# Patient Record
Sex: MALE | Race: WHITE
[De-identification: names, ages, dates, MRNs, and addresses within clinical notes are randomized per-mention and may not be internally consistent; named-entity substitution may affect disease eponyms.]

---

## 2017-06-10 ENCOUNTER — HOSPITAL ENCOUNTER (EMERGENCY)
Dept: HOSPITAL 43 - DL.ED | Age: 27
Discharge: HOME | End: 2017-06-10
Payer: SELF-PAY

## 2017-06-10 VITALS — SYSTOLIC BLOOD PRESSURE: 115 MMHG | DIASTOLIC BLOOD PRESSURE: 79 MMHG

## 2017-06-10 DIAGNOSIS — F17.210: ICD-10-CM

## 2017-06-10 DIAGNOSIS — M62.838: ICD-10-CM

## 2017-06-10 DIAGNOSIS — M75.101: Primary | ICD-10-CM

## 2017-06-10 PROCEDURE — 72040 X-RAY EXAM NECK SPINE 2-3 VW: CPT

## 2017-06-10 PROCEDURE — 73030 X-RAY EXAM OF SHOULDER: CPT

## 2017-06-10 PROCEDURE — 96372 THER/PROPH/DIAG INJ SC/IM: CPT

## 2017-06-10 PROCEDURE — 99283 EMERGENCY DEPT VISIT LOW MDM: CPT

## 2017-06-10 NOTE — EDM.PDOC
ED HPI GENERAL MEDICAL PROBLEM





- General


Chief Complaint: Neck Problem


Stated Complaint: [id number] NECK AND RIGHT SHOULDER


Time Seen by Provider: 06/10/17 10:23


Source of Information: Reports: Patient, Old Records, RN, RN Notes Reviewed


History Limitations: Reports: No Limitations





- History of Present Illness


INITIAL COMMENTS - FREE TEXT/NARRATIVE: 


C/O onset of pain and tightness in the Rt shoulder and neck. Denies any 

specific injury. No radiating pain, numbness, or weakness. 





Onset: Gradual


Duration: Day(s): (2-3), Constant


Location: Reports: Neck, Upper Extremity, Right


Quality: Reports: Ache, Other (spasms)


Severity: Severe


Improves with: Reports: Immobilization, Rest


Worsens with: Reports: Movement


Associated Symptoms: Reports: No Other Symptoms


  ** Right Shoulder


Pain Score (Numeric/FACES): 9





- Related Data


 Allergies











Allergy/AdvReac Type Severity Reaction Status Date / Time


 


No Known Allergies Allergy   Verified 06/10/17 10:18











Home Meds: 


 Home Meds





. [No Known Home Meds]  09/12/16 [History]











Past Medical History





- Past Health History


Medical/Surgical History: Denies Medical/Surgical History





Social & Family History





- Family History


Family Medical History: Noncontributory





- Tobacco Use


Smoking Status *Q: Current Every Day Smoker


Years of Tobacco use: 8


Packs/Tins Daily: 0.2





- Caffeine Use


Caffeine Use: Reports: Soda





- Alcohol Use


Days Per Week of Alcohol Use: 2


Number of Drinks Per Day: 7


Total Drinks Per Week: 14





- Recreational Drug Use


Recreational Drug Use: No





- Living Situation & Occupation


Living situation: Reports: with Significant Other


Occupation: Employed





ED ROS GENERAL





- Review of Systems


Review Of Systems: ROS reveals no pertinent complaints other than HPI.





ED EXAM, UPPER BACK/NECK PAIN





- Physical Exam


Exam: See Below


Exam Limited By: No Limitations


General Appearance: Alert, WD/WN, No Apparent Distress


Throat/Mouth Exam: Normal Inspection


Head Exam: Atraumatic, Normocephalic


Neck Exam: Muscle Spasm (R>L lower cervical paraspinal), Painful Range of Motion

, Paraspinous Muscle Tender.  No: Spinous Processes Tender


Nexus Criteria: No: Posterior, Midline Cervical Tenderness, Evidence of 

Intoxication, Altered Level of Consciousness, Focal Neurological Deficit, 

Painful Distraction Injuries


Cardiovascular/Respiratory: Regular Rate, Rhythm, Normal Peripheral Pulses, 

Normal Breath Sounds


Back Exam: Full Range of Motion, Muscle Spasm (Rt upper paraspinal and Rt trap.)

.  No: Vertebral Tenderness


Extremities: Non-Tender, Other (Rt shoulder with non-painful ROM below shoulder 

level. Pain with abduction, an abduction limited to just slightly less than 90 

degrees, pain improved w/R arm passively held at 90 degrees abduction).  No: 

Joint Swelling, Increased Warmth, Redness


Neurologic: CNs II-XII nml As Tested, No Motor/Sensory Deficits, Alert, Normal 

Mood/Affect, Oriented x 3


Psychiatric: Normal Affect, Normal Mood


Skin Exam: Normal Color, Warm/Dry





Course





- Vital Signs


Last Recorded V/S: 


 Last Vital Signs











Temp  36.8 C   06/10/17 10:18


 


Pulse  76   06/10/17 10:18


 


Resp  18   06/10/17 10:18


 


BP  115/79   06/10/17 10:18


 


Pulse Ox  100   06/10/17 10:18














- Orders/Labs/Meds


Orders: 


 Active Orders 24 hr











 Category Date Time Status


 


 Cervical Spine 2V or 3V [CR] Urgent Exams  06/10/17 10:30 Ordered


 


 Shoulder Comp Rt [CR] Urgent Exams  06/10/17 10:30 Ordered











Meds: 


Medications














Discontinued Medications














Generic Name Dose Route Start Last Admin





  Trade Name Freq  PRN Reason Stop Dose Admin


 


Ketorolac Tromethamine  60 mg  06/10/17 10:31  





  Toradol  IM  06/10/17 10:32  





  ONETIME ONE   














- Radiology Interpretation


Free Text/Narrative:: 


Xray C-spine: reversal of cervical lordotic curvature, no fractures; see Rad. 

report.


Xray Rt shoulder: no fractures, no acute findings; see Rad. report.





Departure





- Departure


Time of Disposition: 10:51


Disposition: Home, Self-Care 01


Condition: good


Clinical Impression: 


 Cervical paraspinal muscle spasm, Rotator cuff syndrome of right shoulder








- Discharge Information


Instructions:  Muscle Cramps and Spasms, Easy-to-Read, Impingement Syndrome, 

Rotator Cuff, Bursitis With Rehab-SportsMed


Forms:  ED Department Discharge


Additional Instructions: 


Rx: Naprosyn 500mg


Rx: Cyclobenzaprine 10mg


Moist hot packs to right should/neck as needed for muscle spasms.


Rest right shoulder, maintain range of motion but no repetitive shoulder use or 

heavy lifting.


Avoid overhead reaching with right shoulder.


Follow up in clinic in 1 week if not improved.





- My Orders


Last 24 Hours: 


My Active Orders





06/10/17 10:30


Cervical Spine 2V or 3V [CR] Urgent 


Shoulder Comp Rt [CR] Urgent 














- Assessment/Plan


Last 24 Hours: 


My Active Orders





06/10/17 10:30


Cervical Spine 2V or 3V [CR] Urgent 


Shoulder Comp Rt [CR] Urgent

## 2018-04-09 ENCOUNTER — HOSPITAL ENCOUNTER (EMERGENCY)
Dept: HOSPITAL 43 - DL.ED | Age: 28
Discharge: HOME | End: 2018-04-09
Payer: COMMERCIAL

## 2018-04-09 VITALS — DIASTOLIC BLOOD PRESSURE: 67 MMHG | SYSTOLIC BLOOD PRESSURE: 107 MMHG

## 2018-04-09 DIAGNOSIS — J10.1: Primary | ICD-10-CM

## 2018-04-09 LAB
CHLORIDE SERPL-SCNC: 95 MMOL/L (ref 101–111)
SODIUM SERPL-SCNC: 132 MMOL/L (ref 135–145)

## 2018-04-09 PROCEDURE — 85025 COMPLETE CBC W/AUTO DIFF WBC: CPT

## 2018-04-09 PROCEDURE — 96361 HYDRATE IV INFUSION ADD-ON: CPT

## 2018-04-09 PROCEDURE — 96374 THER/PROPH/DIAG INJ IV PUSH: CPT

## 2018-04-09 PROCEDURE — 80305 DRUG TEST PRSMV DIR OPT OBS: CPT

## 2018-04-09 PROCEDURE — 80053 COMPREHEN METABOLIC PANEL: CPT

## 2018-04-09 PROCEDURE — 99283 EMERGENCY DEPT VISIT LOW MDM: CPT

## 2018-04-09 PROCEDURE — 71046 X-RAY EXAM CHEST 2 VIEWS: CPT

## 2018-04-09 PROCEDURE — 36415 COLL VENOUS BLD VENIPUNCTURE: CPT

## 2018-04-09 PROCEDURE — 87430 STREP A AG IA: CPT

## 2018-04-09 PROCEDURE — 81001 URINALYSIS AUTO W/SCOPE: CPT

## 2018-04-09 PROCEDURE — 87081 CULTURE SCREEN ONLY: CPT

## 2018-04-09 PROCEDURE — 87804 INFLUENZA ASSAY W/OPTIC: CPT

## 2018-04-09 NOTE — CR
Clinical history: 27-year-old male with fever, cough and chest pain.

 

Interpretation: 

 

Subtle peribronchial "cuffing" and some generalized mild air trapping suggesting bronchospasm but.. n
o focal lobar pneumonia.

 

No atelectasis/collapse. No lung mass or hilar lymphadenopathy.

 

Normal cardiac silhouette without alveolar edema or dependent effusion. Left-sided aortic arch.

 

No pneumothorax.

 

CONCLUSION: Chronic mild bronchitis/bronchiolitis. No new lobar pneumonia or signs of heart failure c
ompared to 3 March 2009.

## 2018-04-09 NOTE — EDM.PDOC
ED HPI GENERAL MEDICAL PROBLEM





- General


Chief Complaint: Fever


Stated Complaint: COUGH, FEVER


Time Seen by Provider: 04/09/18 12:34


Source of Information: Reports: Patient, RN, RN Notes Reviewed


History Limitations: Reports: No Limitations





- History of Present Illness


INITIAL COMMENTS - FREE TEXT/NARRATIVE: 


C/O onset of cough Friday, April 6th. Yesterday he reports sudden onset of fever

, chills, body aches, and cough became worse. Denies shortness of breath or 

wheezing. Admits to sharp and burning pains in chest with coughing. Denies 

abdominal pain, N/V/D/C, sore throat, rash or stiff neck.





Onset: Sudden


Duration: Constant


Location: Reports: Chest, Generalized


Quality: Reports: Burning, Sharp


Severity: Severe


Improves with: Reports: None


Worsens with: Reports: None


Associated Symptoms: Reports: No Other Symptoms


Treatments PTA: Reports: Acetaminophen


  ** Middle Chest


Pain Score (Numeric/FACES): 5





- Related Data


 Allergies











Allergy/AdvReac Type Severity Reaction Status Date / Time


 


No Known Allergies Allergy   Verified 04/09/18 12:17











Home Meds: 


 Home Meds





. [No Known Home Meds]  09/12/16 [History]











Past Medical History





- Past Health History


Medical/Surgical History: Denies Medical/Surgical History





Social & Family History





- Family History


Family Medical History: Noncontributory





- Tobacco Use


Smoking Status *Q: Never Smoker


Years of Tobacco use: 0


Packs/Tins Daily: 0





- Caffeine Use


Caffeine Use: Reports: Soda





- Alcohol Use


Days Per Week of Alcohol Use: 2


Number of Drinks Per Day: 7


Total Drinks Per Week: 14





- Recreational Drug Use


Recreational Drug Use: No





- Living Situation & Occupation


Living situation: Reports: with Significant Other


Occupation: Employed





ED ROS GENERAL





- Review of Systems


Review Of Systems: ROS reveals no pertinent complaints other than HPI.





ED EXAM, GENERAL





- Physical Exam


Exam: See Below


Exam Limited By: No Limitations


General Appearance: Alert, WD/WN, No Apparent Distress, Other (acutely ill, but 

non-toxic appearing)


Eye Exam: Bilateral Eye: Normal Inspection


Ears: Normal External Exam, Normal Canal, Hearing Grossly Normal, Normal TMs


Nose: No Blood, Nasal Drainage (clear, mild)


Throat/Mouth: Normal Inspection, Normal Lips, Normal Teeth, Normal Gums, Normal 

Oropharynx, Normal Voice, No Airway Compromise


Head: Atraumatic, Normocephalic


Neck: Normal Inspection, Supple, Non-Tender, Full Range of Motion, Other (no 

nuchal rigidity).  No: Lymphadenopathy (L), Lymphadenopathy (R)


Respiratory/Chest: No Respiratory Distress, No Accessory Muscle Use, Chest Non-

Tender, Crackles, Wheezing (rare/intermittent scattered mild wheezes).  No: 

Rales, Rhonchi


Cardiovascular: Regular Rate, Rhythm, No Edema, No Murmur


GI/Abdominal: Normal Bowel Sounds, Soft, Non-Tender, No Organomegaly, No 

Distention, No Abnormal Bruit, No Mass


 (Male) Exam: Deferred


Rectal (Males) Exam: Deferred


Back Exam: Normal Inspection, Full Range of Motion.  No: CVA Tenderness (L), 

CVA Tenderness (R)


Extremities: Normal Inspection, Normal Range of Motion, Non-Tender, Normal 

Capillary Refill, No Pedal Edema


Psychiatric: Normal Affect, Normal Mood


Skin Exam: Warm, Dry, Intact, Normal Color, No Rash





Course





- Vital Signs


Last Recorded V/S: 


 Last Vital Signs











Temp  40 C H  04/09/18 12:18


 


Pulse  98   04/09/18 12:18


 


Resp  16   04/09/18 12:18


 


BP  107/67   04/09/18 12:18


 


Pulse Ox  99   04/09/18 12:18














- Orders/Labs/Meds


Orders: 


 Active Orders 24 hr











 Category Date Time Status


 


 Peripheral IV Care [RC] .AS DIRECTED Care  04/09/18 12:36 Active


 


 Chest 2V [CR] Stat Exams  04/09/18 12:36 Taken


 


 COMPREHENSIVE METABOLIC PN,CMP [CHEM] Stat Lab  04/09/18 12:39 Received


 


 CULTURE STREP A CONFIRMATION [RM] Stat Lab  04/09/18 12:28 Results


 


 STREP SCRN A RAPID W CULT CONF [RM] Stat Lab  04/09/18 12:28 Results


 


 Sodium Chloride 0.9% [Normal Saline] 1,000 ml Med  04/09/18 12:37 Active





 IV .BOLUS   


 


 Sodium Chloride 0.9% [Saline Flush] Med  04/09/18 12:35 Active





 10 ml FLUSH ASDIRECTED PRN   


 


 Peripheral IV Insertion Adult [OM.PC] Stat Oth  04/09/18 12:35 Ordered








 Medication Orders





Sodium Chloride (Normal Saline)  1,000 mls @ 999 mls/hr IV .BOLUS ONE


   Stop: 04/09/18 13:37


   Last Admin: 04/09/18 12:45  Dose: 999 mls/hr


Sodium Chloride (Saline Flush)  10 ml FLUSH ASDIRECTED PRN


   PRN Reason: Keep Vein Open


   Last Admin: 04/09/18 12:37  Dose: 10 ml








Labs: 


 Laboratory Tests











  04/09/18 04/09/18 04/09/18 Range/Units





  12:30 12:30 12:39 


 


WBC    7.1  (5.0-10.0)  10^3/uL


 


RBC    5.07  (4.6-6.2)  10^6/uL


 


Hgb    14.6  (14.0-18.0)  g/dL


 


Hct    42.0  (40.0-54.0)  %


 


MCV    82.8  ()  fL


 


MCH    28.8  (27.0-34.0)  pg


 


MCHC    34.8  (33.0-35.0)  g/dL


 


Plt Count    100 L  (150-450)  10^3/uL


 


Neut % (Auto)    71.4  (42.2-75.2)  %


 


Lymph % (Auto)    13.7 L  (20.5-50.1)  %


 


Mono % (Auto)    14.8 H  (2-8)  %


 


Eos % (Auto)    0.0 L  (1.0-3.0)  %


 


Baso % (Auto)    0.1  (0.0-1.0)  %


 


Urine Color  Yellow    (YELLOW)  


 


Urine Appearance  Clear    (CLEAR)  


 


Urine pH  6.0    (5.0-9.0)  


 


Ur Specific Gravity  1.010    (1.005-1.030)  


 


Urine Protein  Trace H    (NEGATIVE)  


 


Urine Glucose (UA)  Negative    (NEGATIVE)  


 


Urine Ketones  Negative    (NEGATIVE)  


 


Urine Occult Blood  Negative    (NEGATIVE)  


 


Urine Nitrite  Negative    (NEGATIVE)  


 


Urine Bilirubin  Negative    (NEGATIVE)  


 


Urine Urobilinogen  1.0    (0.2-1.0)  mg/dL


 


Ur Leukocyte Esterase  Negative    (NEGATIVE)  


 


Urine RBC  0-5    /HPF


 


Urine WBC  5-10 H    (0-5/HPF)  /HPF


 


Ur Epithelial Cells  Few    /HPF


 


Urine Bacteria  Rare    (0-FEW/HPF)  /HPF


 


Urine Mucus  Many H    /LPF


 


Urine Other  See note    


 


Urinalysis Comment      


 


Urine Opiates Screen   Negative   (NEGATIVE)  


 


Ur Oxycodone Screen   Negative   (NEGATIVE)  


 


Urine Methadone Screen   Negative   (NEGATIVE)  


 


Ur Barbiturates Screen   Negative   (NEGATIVE)  


 


U Tricyclic Antidepress   Negative   (NEGATIVE)  


 


Ur Phencyclidine Scrn   Negative   (NEGATIVE)  


 


Ur Amphetamine Screen   Negative   (NEGATIVE)  


 


U Methamphetamines Scrn   Negative   (NEGATIVE)  


 


Urine MDMA Screen   Negative   (NEGATIVE)  


 


U Benzodiazepines Scrn   Negative   (NEGATIVE)  


 


Urine Cocaine Screen   Negative   (NEGATIVE)  


 


U Marijuana (THC) Screen   Negative   (NEGATIVE)  











Meds: 


Medications











Generic Name Dose Route Start Last Admin





  Trade Name Freq  PRN Reason Stop Dose Admin


 


Sodium Chloride  1,000 mls @ 999 mls/hr  04/09/18 12:37  04/09/18 12:45





  Normal Saline  IV  04/09/18 13:37  999 mls/hr





  .BOLUS ONE   Administration





     





     





     





     


 


Sodium Chloride  10 ml  04/09/18 12:35  04/09/18 12:37





  Saline Flush  FLUSH   10 ml





  ASDIRECTED PRN   Administration





  Keep Vein Open   





     





     





     














Discontinued Medications














Generic Name Dose Route Start Last Admin





  Trade Name Freq  PRN Reason Stop Dose Admin


 


Acetaminophen  975 mg  04/09/18 12:37  04/09/18 12:44





  Tylenol  PO  04/09/18 12:38  975 mg





  NOW ONE   Administration





     





     





     





     


 


Methylprednisolone Sodium Succinate  125 mg  04/09/18 13:06  





  Solu-Medrol  IVPUSH  04/09/18 13:07  





  ONETIME ONE   





     





     





     





     


 


Oseltamivir Phosphate  75 mg  04/09/18 13:06  





  Tamiflu  PO  04/09/18 13:07  





  ONETIME ONE   





     





     





     





     














- Radiology Interpretation


Free Text/Narrative:: 


CXR: no focal consolidations, see Rad. report.








Departure





- Departure


Time of Disposition: 13:19


Disposition: Home, Self-Care 01


Condition: Good


Clinical Impression: 


 Influenza








- Discharge Information


Instructions:  Influenza, Adult


Forms:  ED Department Discharge


Additional Instructions: 


Rx: Tamiflu 75mg


Use Tylenol and/or Ibuprofen as needed for fevers. Follow directions on label 

for dosing and precautions.


Rest and drink plenty of water.


Follow up in clinic if not improving in 7 to 10 days.


Return to ER if any breathing difficulties develop.





- My Orders


Last 24 Hours: 


My Active Orders





04/09/18 12:28


CULTURE STREP A CONFIRMATION [RM] Stat 


STREP SCRN A RAPID W CULT CONF [RM] Stat 





04/09/18 12:35


Sodium Chloride 0.9% [Saline Flush]   10 ml FLUSH ASDIRECTED PRN 


Peripheral IV Insertion Adult [OM.PC] Stat 





04/09/18 12:36


Peripheral IV Care [RC] .AS DIRECTED 


Chest 2V [CR] Stat 





04/09/18 12:37


Sodium Chloride 0.9% [Normal Saline] 1,000 ml IV .BOLUS 





04/09/18 12:39


COMPREHENSIVE METABOLIC PN,CMP [CHEM] Stat 














- Assessment/Plan


Last 24 Hours: 


My Active Orders





04/09/18 12:28


CULTURE STREP A CONFIRMATION [RM] Stat 


STREP SCRN A RAPID W CULT CONF [RM] Stat 





04/09/18 12:35


Sodium Chloride 0.9% [Saline Flush]   10 ml FLUSH ASDIRECTED PRN 


Peripheral IV Insertion Adult [OM.PC] Stat 





04/09/18 12:36


Peripheral IV Care [RC] .AS DIRECTED 


Chest 2V [CR] Stat 





04/09/18 12:37


Sodium Chloride 0.9% [Normal Saline] 1,000 ml IV .BOLUS 





04/09/18 12:39


COMPREHENSIVE METABOLIC PN,CMP [CHEM] Stat

## 2022-01-17 ENCOUNTER — HOSPITAL ENCOUNTER (EMERGENCY)
Dept: HOSPITAL 43 - DL.ED | Age: 32
Discharge: HOME | End: 2022-01-17
Payer: SELF-PAY

## 2022-01-17 VITALS — HEART RATE: 78 BPM | DIASTOLIC BLOOD PRESSURE: 80 MMHG | SYSTOLIC BLOOD PRESSURE: 119 MMHG

## 2022-01-17 DIAGNOSIS — U07.1: Primary | ICD-10-CM

## 2022-01-17 DIAGNOSIS — Z72.0: ICD-10-CM

## 2022-01-17 LAB — SARS-COV-2 RNA RESP QL NAA+PROBE: POSITIVE

## 2023-05-15 ENCOUNTER — HOSPITAL ENCOUNTER (EMERGENCY)
Dept: HOSPITAL 43 - DL.ED | Age: 33
Discharge: HOME | End: 2023-05-15
Payer: COMMERCIAL

## 2023-05-15 VITALS — DIASTOLIC BLOOD PRESSURE: 90 MMHG | SYSTOLIC BLOOD PRESSURE: 123 MMHG | HEART RATE: 71 BPM

## 2023-05-15 DIAGNOSIS — Z87.891: ICD-10-CM

## 2023-05-15 DIAGNOSIS — R07.89: Primary | ICD-10-CM

## 2023-05-15 LAB
ALBUMIN SERPL-MCNC: 4.2 G/DL (ref 3.4–5)
ALBUMIN/GLOB SERPL: 1.2 {RATIO}
ALP SERPL-CCNC: 82 U/L (ref 46–116)
ALT SERPL-CCNC: 20 U/L (ref 16–63)
ANION GAP SERPL CALC-SCNC: 11.8 MEQ/L (ref 7–13)
APTT PPP: 31.7 SEC (ref 22–34)
AST SERPL-CCNC: 14 U/L (ref 15–37)
BASOPHILS NFR BLD AUTO: 0.4 % (ref 0–1)
BILIRUB SERPL-MCNC: 0.5 MG/DL (ref 0.2–1)
BUN SERPL-MCNC: 13 MG/DL (ref 7–18)
BUN/CREAT SERPL: 13.8
CALCIUM SERPL-MCNC: 8.8 MG/DL (ref 8.5–10.1)
CHLORIDE SERPL-SCNC: 103 MMOL/L (ref 98–107)
CO2 SERPL-SCNC: 30 MMOL/L (ref 21–32)
CREAT CL 24H UR+SERPL-VRATE: 98.04 ML/MIN
CREAT SERPL-MCNC: 0.94 MG/DL (ref 0.7–1.3)
EGFRCR SERPLBLD CKD-EPI 2021: 109 ML/MIN (ref 60–?)
EOSINOPHIL NFR BLD AUTO: 2.5 % (ref 1–3)
GLOBULIN SER-MCNC: 3.6 G/DL
GLUCOSE SERPL-MCNC: 80 MG/DL (ref 70–99)
HCT VFR BLD AUTO: 40.9 % (ref 40–54)
HGB BLD-MCNC: 14 G/DL (ref 14–18)
INR PPP: 1 (ref 0.9–1.2)
LACTATE SERPL-SCNC: 0.9 MMOL/L (ref 0.4–2)
LYMPHOCYTES NFR BLD AUTO: 29.2 % (ref 20.5–50.1)
MAGNESIUM SERPL-MCNC: 2.2 MG/DL (ref 1.8–2.4)
MCH RBC QN AUTO: 27.7 PG (ref 27–34)
MCHC RBC AUTO-ENTMCNC: 34.2 G/DL (ref 33–35)
MCHC RBC AUTO-ENTMCNC: 81 FL (ref 80–100)
MONOCYTES NFR BLD AUTO: 11.4 % (ref 2–8)
NEUTROPHILS NFR BLD AUTO: 56.5 % (ref 42.2–75.2)
PLATELET # BLD AUTO: 207 10^3/UL (ref 150–450)
POTASSIUM SERPL-SCNC: 3.8 MMOL/L (ref 3.5–5.1)
PROT SERPL-MCNC: 7.8 G/DL (ref 6.4–8.2)
PROTHROMBIN TIME: 10 SEC (ref 9–12)
RBC # BLD AUTO: 5.05 10^6/UL (ref 4.6–6.2)
SODIUM SERPL-SCNC: 141 MMOL/L (ref 136–145)
WBC # BLD AUTO: 7.5 10^3/UL (ref 5–10)

## 2023-05-15 PROCEDURE — 84145 PROCALCITONIN (PCT): CPT

## 2023-05-15 PROCEDURE — 85025 COMPLETE CBC W/AUTO DIFF WBC: CPT

## 2023-05-15 PROCEDURE — 93005 ELECTROCARDIOGRAM TRACING: CPT

## 2023-05-15 PROCEDURE — 80053 COMPREHEN METABOLIC PANEL: CPT

## 2023-05-15 PROCEDURE — 84484 ASSAY OF TROPONIN QUANT: CPT

## 2023-05-15 PROCEDURE — 96374 THER/PROPH/DIAG INJ IV PUSH: CPT

## 2023-05-15 PROCEDURE — 71045 X-RAY EXAM CHEST 1 VIEW: CPT

## 2023-05-15 PROCEDURE — 36415 COLL VENOUS BLD VENIPUNCTURE: CPT

## 2023-05-15 PROCEDURE — 99285 EMERGENCY DEPT VISIT HI MDM: CPT

## 2023-05-15 PROCEDURE — 85610 PROTHROMBIN TIME: CPT

## 2023-05-15 PROCEDURE — 85730 THROMBOPLASTIN TIME PARTIAL: CPT

## 2023-05-15 PROCEDURE — 83735 ASSAY OF MAGNESIUM: CPT

## 2023-05-15 PROCEDURE — 83605 ASSAY OF LACTIC ACID: CPT

## 2023-08-25 ENCOUNTER — HOSPITAL ENCOUNTER (EMERGENCY)
Dept: HOSPITAL 43 - DL.ED | Age: 33
Discharge: HOME | End: 2023-08-25
Payer: COMMERCIAL

## 2023-08-25 VITALS — HEART RATE: 18 BPM | DIASTOLIC BLOOD PRESSURE: 74 MMHG | SYSTOLIC BLOOD PRESSURE: 108 MMHG

## 2023-08-25 DIAGNOSIS — I25.2: ICD-10-CM

## 2023-08-25 DIAGNOSIS — K02.9: Primary | ICD-10-CM

## 2023-08-25 PROCEDURE — 99282 EMERGENCY DEPT VISIT SF MDM: CPT

## 2023-08-25 PROCEDURE — 96372 THER/PROPH/DIAG INJ SC/IM: CPT

## 2025-06-01 ENCOUNTER — HOSPITAL ENCOUNTER (EMERGENCY)
Dept: HOSPITAL 43 - DL.ED | Age: 35
Discharge: LEFT BEFORE DISCHARGE | End: 2025-06-01
Payer: COMMERCIAL

## 2025-06-01 DIAGNOSIS — Z53.21: Primary | ICD-10-CM
